# Patient Record
Sex: FEMALE | URBAN - METROPOLITAN AREA
[De-identification: names, ages, dates, MRNs, and addresses within clinical notes are randomized per-mention and may not be internally consistent; named-entity substitution may affect disease eponyms.]

---

## 2022-08-22 ENCOUNTER — APPOINTMENT (OUTPATIENT)
Dept: URBAN - METROPOLITAN AREA CLINIC 247 | Age: 82
Setting detail: DERMATOLOGY
End: 2022-08-23

## 2022-08-22 DIAGNOSIS — L91.8 OTHER HYPERTROPHIC DISORDERS OF THE SKIN: ICD-10-CM

## 2022-08-22 DIAGNOSIS — L82.1 OTHER SEBORRHEIC KERATOSIS: ICD-10-CM

## 2022-08-22 DIAGNOSIS — D18.0 HEMANGIOMA: ICD-10-CM

## 2022-08-22 DIAGNOSIS — L57.8 OTHER SKIN CHANGES DUE TO CHRONIC EXPOSURE TO NONIONIZING RADIATION: ICD-10-CM

## 2022-08-22 DIAGNOSIS — L57.0 ACTINIC KERATOSIS: ICD-10-CM

## 2022-08-22 PROBLEM — D18.01 HEMANGIOMA OF SKIN AND SUBCUTANEOUS TISSUE: Status: ACTIVE | Noted: 2022-08-22

## 2022-08-22 PROCEDURE — 99213 OFFICE O/P EST LOW 20 MIN: CPT | Mod: 25

## 2022-08-22 PROCEDURE — 17000 DESTRUCT PREMALG LESION: CPT

## 2022-08-22 PROCEDURE — OTHER MIPS QUALITY: OTHER

## 2022-08-22 PROCEDURE — OTHER LIQUID NITROGEN: OTHER

## 2022-08-22 PROCEDURE — 17003 DESTRUCT PREMALG LES 2-14: CPT

## 2022-08-22 PROCEDURE — OTHER COUNSELING: OTHER

## 2022-08-22 ASSESSMENT — LOCATION ZONE DERM
LOCATION ZONE: TRUNK
LOCATION ZONE: FACE
LOCATION ZONE: NECK
LOCATION ZONE: LEG

## 2022-08-22 ASSESSMENT — LOCATION DETAILED DESCRIPTION DERM
LOCATION DETAILED: UPPER STERNUM
LOCATION DETAILED: LEFT KNEE
LOCATION DETAILED: RIGHT FOREHEAD
LOCATION DETAILED: LEFT INFERIOR CENTRAL MALAR CHEEK
LOCATION DETAILED: LEFT MEDIAL UPPER BACK
LOCATION DETAILED: RIGHT MEDIAL SUPERIOR CHEST
LOCATION DETAILED: LEFT SUPERIOR MEDIAL UPPER BACK
LOCATION DETAILED: LEFT PROXIMAL PRETIBIAL REGION
LOCATION DETAILED: LEFT INFERIOR MEDIAL MALAR CHEEK
LOCATION DETAILED: LEFT CENTRAL MALAR CHEEK
LOCATION DETAILED: LEFT INFERIOR ANTERIOR NECK

## 2022-08-22 ASSESSMENT — LOCATION SIMPLE DESCRIPTION DERM
LOCATION SIMPLE: LEFT CHEEK
LOCATION SIMPLE: LEFT PRETIBIAL REGION
LOCATION SIMPLE: CHEST
LOCATION SIMPLE: LEFT KNEE
LOCATION SIMPLE: RIGHT FOREHEAD
LOCATION SIMPLE: LEFT ANTERIOR NECK
LOCATION SIMPLE: LEFT UPPER BACK

## 2022-08-22 NOTE — PROCEDURE: LIQUID NITROGEN
Post-Care Instructions: Liquid Nitrogen is an extremely cold liquid. When applied to the skin, it causes rapid freezing, producing instantaneous frostbite. Immediately following treatment you will notice redness, swelling, and maybe mild itching or tingling. This sensation will subside in a few minutes. A superficial blister may form and scabbing will follow. If extensive freezing has been done, a blister may develop which may be large and filled with blood. If the blister is uncomfortable, it is okay to puncture it with a sterile (alcohol-soaked) needle, leaving the blister top in place.\\n\\nWash the area daily with soap and water and gently pat dry. Apply a thin layer of Vaseline or Aquaphor twice daily to the wound. You may choose to use a bandage or leave it open. For several days, there may be weeping or oozing of clear or blood-tinged fluid from the treatment area. A crust or scab usually forms within 1-2 weeks. The crust should fall off on its own in 2-4 weeks. There is occasionally a loss of pigment from the area, which generally resolves in 3-4 months.\\n\\nOccasionally some mild pain will occur in the treated areas, which can be controlled with Tylenol Extra Strength. If an area near the eyelid has been treated, swelling may occur. In general, cryotherapy causes minimal scar formation and only rare post-operative infections.\\n\\nWhen should I call the office?\\nIf you see redness developing/spreading, increasing pain, drainage of pus, spreading of the incision, or have a question, call the office at [phone]. If you are calling after the office has closed, please call [phone], listen to the message in its entirety and when prompted, press \"1\" to be connected to our answering service. Post-Care Instructions: Liquid Nitrogen is an extremely cold liquid. When applied to the skin, it causes rapid freezing, producing instantaneous frostbite. Immediately following treatment you will notice redness, swelling, and maybe mild itching or tingling. This sensation will subside in a few minutes. A superficial blister may form and scabbing will follow. If extensive freezing has been done, a blister may develop which may be large and filled with blood. If the blister is uncomfortable, it is okay to puncture it with a sterile (alcohol-soaked) needle, leaving the blister top in place.\\n\\nWash the area daily with soap and water and gently pat dry. Apply a thin layer of Vaseline or Aquaphor twice daily to the wound. You may choose to use a bandage or leave it open. For several days, there may be weeping or oozing of clear or blood-tinged fluid from the treatment area. A crust or scab usually forms within 1-2 weeks. The crust should fall off on its own in 2-4 weeks. There is occasionally a loss of pigment from the area, which generally resolves in 3-4 months.\\n\\nOccasionally some mild pain will occur in the treated areas, which can be controlled with Tylenol Extra Strength. If an area near the eyelid has been treated, swelling may occur. In general, cryotherapy causes minimal scar formation and only rare post-operative infections.\\n\\nWhen should I call the office?\\nIf you see redness developing/spreading, increasing pain, drainage of pus, spreading of the incision, or have a question, call the office at [phone]. If you are calling after the office has closed, please call [phone], listen to the message in its entirety and when prompted, press \"1\" to be connected to our answering service.